# Patient Record
(demographics unavailable — no encounter records)

---

## 2025-01-01 NOTE — ASSESSMENT
[FreeTextEntry1] : N/V, son with norovirus, presume patient has the same.  She appears nontoxic.  Doubt appy, pancreatitis, etc.  She understands the inherent limitations of a virtual eval for her GI symptoms.

## 2025-01-01 NOTE — PLAN
[FreeTextEntry1] : Zofran Pepcid  BRAT diet, pedialyte, maryam, etc Very strict ED precautions discussed PCP f/u

## 2025-01-01 NOTE — HISTORY OF PRESENT ILLNESS
[Home] : at home, [unfilled] , at the time of the visit. [Other Location: e.g. Home (Enter Location, City,State)___] : at [unfilled] [Verbal consent obtained from patient] : the patient, [unfilled] [Spouse] : spouse [FreeTextEntry8] : 28 y F c vomiting NBNB 8-10 times, most in the last 4-6 hours.  No abd pain.  No f/c.  No diarrhea Sick contact -- 1 yr old with Norovirus H/o asthma PCN allergy LMP 2 wks ago

## 2025-01-01 NOTE — PHYSICAL EXAM
[de-identified] : PLEASE SEE HPI FOR ADDITIONAL RELEVANT PHYSICAL EXAM FINDINGS GENERAL: vomiting during eval but overall nontoxic.  appears to have MMM HEAD: normocephalic EYES: no scleral icterus NECK: trachea midline, Full ROM RESP: no respiratory distress ABD: nondistended MSK: no gross deformity NEURO: alert & fully oriented SKIN: no rash PSYCH: cooperative, good insight, appropriate, fluent speech